# Patient Record
Sex: MALE
[De-identification: names, ages, dates, MRNs, and addresses within clinical notes are randomized per-mention and may not be internally consistent; named-entity substitution may affect disease eponyms.]

---

## 2019-01-31 ENCOUNTER — TRANSCRIPTION ENCOUNTER (OUTPATIENT)
Age: 29
End: 2019-01-31

## 2019-05-07 PROBLEM — Z00.00 ENCOUNTER FOR PREVENTIVE HEALTH EXAMINATION: Status: ACTIVE | Noted: 2019-05-07

## 2019-05-14 ENCOUNTER — APPOINTMENT (OUTPATIENT)
Dept: DERMATOLOGY | Facility: CLINIC | Age: 29
End: 2019-05-14
Payer: MEDICAID

## 2019-05-14 VITALS
SYSTOLIC BLOOD PRESSURE: 119 MMHG | WEIGHT: 141 LBS | HEIGHT: 65 IN | DIASTOLIC BLOOD PRESSURE: 80 MMHG | BODY MASS INDEX: 23.49 KG/M2

## 2019-05-14 DIAGNOSIS — L29.9 PRURITUS, UNSPECIFIED: ICD-10-CM

## 2019-05-14 PROCEDURE — 99203 OFFICE O/P NEW LOW 30 MIN: CPT

## 2019-05-14 NOTE — HISTORY OF PRESENT ILLNESS
[FreeTextEntry1] : Allergies [de-identified] : Referred by Dr. Bill Aleman\par \par 28M presents for evaluation of \par \par 1. Allergies:  presents with complaints of "allergies" x 3 years (March 2016). Reports intermittent itchiness to skin, which is worsened at night. Previously followed by allergist and had unremarkable pin prick testing. Pt has been receiving Xolair injection q 3 wks for 1 year, unclear if improving per patient. Also, on Zyrtec however unsure of improvement. Pt notes itchiness is present throughout the body with exception of face and palms. Denies rash present during episodes. Denies weight loss, fever, and chills. Previously tried dapsone however WBC dropped at that time which required hospitalization. Pt reports last November had unremarkable CXR and CT scan during admission following medication use. \par Denies presence of rash on skin of any kind during duration of symptoms.\par Patient brought in recent lab work which was reviewed (scanned into chart).\par Medications - Levothyroxine.

## 2019-05-14 NOTE — END OF VISIT
[FreeTextEntry3] : All medical record entries made by the ranibdebra were at my, Dr. Mary Bell direction and personally dictated by me on 5/14/2019, 2:41 PM. I have reviewed the chart and agree that the record accurately reflects my personal performance of the history, physical exam, assessment and plan. I have also personally directed, reviewed and agreed with the chart.

## 2019-05-14 NOTE — ASSESSMENT
[FreeTextEntry1] : 1.Pruritus\par - etiology unknown - no dermatitis observed on skin today. No lesions on exam concerning for mastocytosis or other pruritic dermatosis. Discussed with patient that skin biopsy of unremarkable skin is unlikely to yield useful information. \par I have advised patient to RTC if any lesions on the skin develop. Patient verbalized understanding of the plan.\par Discussed moisturizing and encouraged trial of Sarna ant-itch lotion to AA PRN itch

## 2019-05-14 NOTE — PHYSICAL EXAM
[Alert] : alert [Well Nourished] : well nourished [Oriented x 3] : ~L oriented x 3 [Conjunctiva Non-injected] : conjunctiva non-injected [No Visual Lymphadenopathy] : no visual  lymphadenopathy [No Edema] : no edema [No Clubbing] : no clubbing [No Chromhidrosis] : no chromhidrosis [No Bromhidrosis] : no bromhidrosis [FreeTextEntry3] : TBSE performed - no dermatitis noted on skin